# Patient Record
Sex: MALE | Race: WHITE | NOT HISPANIC OR LATINO | URBAN - METROPOLITAN AREA
[De-identification: names, ages, dates, MRNs, and addresses within clinical notes are randomized per-mention and may not be internally consistent; named-entity substitution may affect disease eponyms.]

---

## 2018-05-07 ENCOUNTER — OFFICE VISIT (OUTPATIENT)
Dept: FAMILY MEDICINE | Facility: CLINIC | Age: 58
End: 2018-05-07
Payer: COMMERCIAL

## 2018-05-07 VITALS
WEIGHT: 165.81 LBS | OXYGEN SATURATION: 98 % | HEART RATE: 80 BPM | HEIGHT: 69 IN | TEMPERATURE: 99 F | SYSTOLIC BLOOD PRESSURE: 150 MMHG | BODY MASS INDEX: 24.56 KG/M2 | DIASTOLIC BLOOD PRESSURE: 90 MMHG

## 2018-05-07 DIAGNOSIS — Z23 NEED FOR TDAP VACCINATION: ICD-10-CM

## 2018-05-07 DIAGNOSIS — Z12.11 ENCOUNTER FOR SCREENING FOR MALIGNANT NEOPLASM OF COLON: ICD-10-CM

## 2018-05-07 DIAGNOSIS — M62.830 BACK SPASM: Primary | ICD-10-CM

## 2018-05-07 PROCEDURE — 3008F BODY MASS INDEX DOCD: CPT | Mod: CPTII,S$GLB,, | Performed by: FAMILY MEDICINE

## 2018-05-07 PROCEDURE — 99213 OFFICE O/P EST LOW 20 MIN: CPT | Mod: 25,S$GLB,, | Performed by: FAMILY MEDICINE

## 2018-05-07 PROCEDURE — 90715 TDAP VACCINE 7 YRS/> IM: CPT | Mod: S$GLB,,, | Performed by: FAMILY MEDICINE

## 2018-05-07 PROCEDURE — 90471 IMMUNIZATION ADMIN: CPT | Mod: S$GLB,,, | Performed by: FAMILY MEDICINE

## 2018-05-07 RX ORDER — METHOCARBAMOL 500 MG/1
500 TABLET, FILM COATED ORAL 3 TIMES DAILY
Qty: 30 TABLET | Refills: 0 | Status: SHIPPED | OUTPATIENT
Start: 2018-05-07 | End: 2018-05-22 | Stop reason: SDUPTHER

## 2018-05-07 RX ORDER — NAPROXEN SODIUM 220 MG
220 TABLET ORAL
COMMUNITY
End: 2018-05-21 | Stop reason: SDUPTHER

## 2018-05-09 ENCOUNTER — TELEPHONE (OUTPATIENT)
Dept: FAMILY MEDICINE | Facility: CLINIC | Age: 58
End: 2018-05-09

## 2018-05-09 RX ORDER — VARENICLINE TARTRATE 0.5 (11)-1
KIT ORAL
Qty: 1 PACKAGE | Refills: 0 | Status: SHIPPED | OUTPATIENT
Start: 2018-05-09 | End: 2019-06-10

## 2018-05-09 RX ORDER — VARENICLINE TARTRATE 1 MG/1
1 TABLET, FILM COATED ORAL 2 TIMES DAILY
Qty: 60 TABLET | Refills: 2 | Status: SHIPPED | OUTPATIENT
Start: 2018-06-08 | End: 2019-06-10

## 2018-05-09 RX ORDER — PREDNISONE 10 MG/1
TABLET ORAL
Qty: 18 TABLET | Refills: 0 | Status: SHIPPED | OUTPATIENT
Start: 2018-05-09 | End: 2019-06-10

## 2018-05-09 NOTE — TELEPHONE ENCOUNTER
----- Message from Kym Lopez sent at 5/9/2018  9:42 AM CDT -----  Contact: Wife / [phone]  The steroids were not sent to the pharmacy.  [facility] to [facility].  Also, he has dedided that he is willing to try the Chantix,  Please send that one to [facility] also.  Was here on Monday for doctor visit

## 2018-05-13 NOTE — PROGRESS NOTES
Patient ID: Jayden Panchal is a 57 y.o. male.    Chief Complaint: Back Pain (due for Tetanus vaccine C scope ) and Leg Pain (recheck B/P)    HPI       Jayden Panchal is a 57 y.o. male here because of acute onset of low back pain.  Patient with history of back pain with exacerbation once or twice a year which lasts just a few days and treated with over-the-counter medicines.  This episode which provoked by increased use has lasted for about 2 weeks without reduction of symptoms with over-the-counter medicines.  Use of NSAIDs heat and ice have not provided relief.  He does have pain of burning-type nature radiating toward right leg but more in the gluteal area.  No loss of strength.  No red flags.  Complains that standing straight and laying down or okay.  Most pain is actually sits for a while and attempts to get up        Review of Symptoms    Constitutional  Neg activity change, No chills/ fever   Resp  Neg hemoptysis, stridor, choking  CVS  Neg chest pain, palpitations    Physical Exam    Constitutional:   Oriented to person, place, and time.appears well-developed and well-nourished.   No distress.     HENT:   Head: Normocephalic and atraumatic.     Right Ear: Tympanic membrane, external ear and ear canal normal     Left Ear: Tympanic membrane, external ear and ear canal normal    Nose: External Normal. Normal turbinates, No rhinorrhea     Mouth/Throat: Uvula is midline, oropharynx is clear and moist and mucous membranes are normal.     Neck: supple no anterior cervical adenopathy    Carotid artery:  Bruit    NEG []   POS[]    Eyes:   Conjunctivae are normal. Right eye exhibits no discharge. Left eye exhibits no discharge. No scleral icterus. No periorbital edema       Cardiovascular:  Regular rate, Regular rhythm     No extrasystole  Normal S1-S2    Pulmonary/Chest:   Normal effort and breath sounds.  No wheezing, rhonchi or rales.      Musculoskeletal:  No edema. No obvious deformity No wasting    appears  uncomfortable and paces occasionally because of pain  Gait is normal.  Strength of upper and lower leg muscle groups normal   sensation is normal.  Able to flex forward about hip over 90 degrees and able to extend spine backwards with no pain.  Experiences pain with elevation from a seated position.      Neurological:   Alert and oriented to person, place, and time. Coordination normal.     Skin:   Skin is warm and dry.   No diaphoresis.   No rash noted.    Psychiatric: Normal mood and affect. Behavior is normal. Judgment and thought content normal.       Assessment / Plan:      ICD-10-CM ICD-9-CM   1. Back spasm M62.830 724.8   2. Encounter for screening for malignant neoplasm of colon Z12.11 V76.51   3. Need for Tdap vaccination Z23 V06.1     Back spasm  -     Ambulatory Referral to Physical/Occupational Therapy    Encounter for screening for malignant neoplasm of colon  -     Case request GI: COLONOSCOPY    Need for Tdap vaccination  -     Tdap Vaccine    Other orders  -     methocarbamol (ROBAXIN) 500 MG Tab; Take 1 tablet (500 mg total) by mouth 3 (three) times daily. As needed for muscle spasm  Dispense: 30 tablet; Refill: 0

## 2018-05-21 ENCOUNTER — TELEPHONE (OUTPATIENT)
Dept: FAMILY MEDICINE | Facility: CLINIC | Age: 58
End: 2018-05-21

## 2018-05-21 RX ORDER — NAPROXEN SODIUM 220 MG
220 TABLET ORAL EVERY 12 HOURS
Qty: 60 TABLET | Refills: 0 | COMMUNITY
Start: 2018-05-21 | End: 2019-06-10

## 2018-05-21 NOTE — TELEPHONE ENCOUNTER
----- Message from Kym Lopez sent at 5/21/2018  1:35 PM CDT -----  Contact: Wife called / [phone]  Patient is requesting a medication refill.     RX name: naproxen sodium   Strength: 220 mg  Quantity: Not sure  Directions: Take 220 mg by mouth every 12 (twelve) hours    [facility]      Phone number where patient can be reached: [phone]    ALSO, the pt is also requesting pain medication to.  Please send both meds to [facility].  Pt states you discussed this a couple of weeks ago.

## 2018-05-22 ENCOUNTER — TELEPHONE (OUTPATIENT)
Dept: FAMILY MEDICINE | Facility: CLINIC | Age: 58
End: 2018-05-22

## 2018-05-22 RX ORDER — METHOCARBAMOL 500 MG/1
500 TABLET, FILM COATED ORAL 3 TIMES DAILY
Qty: 30 TABLET | Refills: 1 | Status: SHIPPED | OUTPATIENT
Start: 2018-05-22 | End: 2018-06-01

## 2018-05-22 NOTE — TELEPHONE ENCOUNTER
----- Message from Kym Lopez sent at 5/22/2018  8:32 AM CDT -----  Contact: Wife / [phone]  Please call Mrs Panchal back regarding the pt's medication.  Received a call stating his meds were called in but the pharmacy does not have anything for him.... Give her a call at [phone]

## 2018-05-22 NOTE — TELEPHONE ENCOUNTER
Called pt back and explained that it was my mistake Dr Biggs said he sent in the Rx but it was put in as OTC. Ms Kate says that Dr Biggs said that he would put the pt on a true pain medication and a muscle relaxer. She says that he is in a great deal of pain and needs something done ASAP because he is getting mad and angry with her

## 2018-05-23 ENCOUNTER — TELEPHONE (OUTPATIENT)
Dept: GASTROENTEROLOGY | Facility: CLINIC | Age: 58
End: 2018-05-23

## 2018-05-23 NOTE — TELEPHONE ENCOUNTER
Colonoscopy Referral    Referring Physician: Dr. Delaney       Date: 07/11/2018    Reason for Referral: Screening Colon     Family History of: None  Colon polyp:  Relationship/Age of Onset:     Colon cancer:   Relationship/Age of Onset:     Patient with:   Hemoccults Done: No     Iron deficient: No     On Blood Thinner: No     Valvular heart disease/valve replacement: No     Anemia Present: No     On NSAID: No     Lung disease: No     Kidney disease: No     Hx of polyps:     Hx of colon cancer:     Previous colon evalations:  None   When:   Where:   Pertinent symptoms:       Review of patient's allergies indicates:  PCN, Neurontin     Patient was scheduled for colonoscopy on 07/11/2018 with Dr. Mcgarry at [facility]. Golytely instructions were reviewed with patient.

## 2018-07-18 ENCOUNTER — ANESTHESIA (OUTPATIENT)
Dept: ENDOSCOPY | Facility: HOSPITAL | Age: 58
End: 2018-07-18
Payer: COMMERCIAL

## 2018-07-18 ENCOUNTER — HOSPITAL ENCOUNTER (OUTPATIENT)
Facility: HOSPITAL | Age: 58
Discharge: HOME | End: 2018-07-18
Attending: INTERNAL MEDICINE | Admitting: INTERNAL MEDICINE
Payer: COMMERCIAL

## 2018-07-18 ENCOUNTER — ANESTHESIA EVENT (OUTPATIENT)
Dept: ENDOSCOPY | Facility: HOSPITAL | Age: 58
End: 2018-07-18
Payer: COMMERCIAL

## 2018-07-18 ENCOUNTER — SURGERY (OUTPATIENT)
Age: 58
End: 2018-07-18

## 2018-07-18 VITALS
OXYGEN SATURATION: 100 % | RESPIRATION RATE: 22 BRPM | SYSTOLIC BLOOD PRESSURE: 152 MMHG | HEIGHT: 69 IN | WEIGHT: 176 LBS | TEMPERATURE: 97 F | DIASTOLIC BLOOD PRESSURE: 86 MMHG | HEART RATE: 95 BPM | BODY MASS INDEX: 26.07 KG/M2

## 2018-07-18 DIAGNOSIS — Z12.11 SCREEN FOR COLON CANCER: Primary | ICD-10-CM

## 2018-07-18 PROCEDURE — 45380 COLONOSCOPY AND BIOPSY: CPT | Performed by: INTERNAL MEDICINE

## 2018-07-18 PROCEDURE — 88305 TISSUE EXAM BY PATHOLOGIST: CPT | Performed by: PATHOLOGY

## 2018-07-18 PROCEDURE — 45385 COLONOSCOPY W/LESION REMOVAL: CPT | Performed by: INTERNAL MEDICINE

## 2018-07-18 PROCEDURE — 37000009 HC ANESTHESIA EA ADD 15 MINS: Performed by: INTERNAL MEDICINE

## 2018-07-18 PROCEDURE — 88305 TISSUE EXAM BY PATHOLOGIST: CPT | Mod: 26,,, | Performed by: PATHOLOGY

## 2018-07-18 PROCEDURE — 27201012 HC FORCEPS, HOT/COLD, DISP: Performed by: INTERNAL MEDICINE

## 2018-07-18 PROCEDURE — 45385 COLONOSCOPY W/LESION REMOVAL: CPT | Mod: 33,,, | Performed by: INTERNAL MEDICINE

## 2018-07-18 PROCEDURE — 63600175 PHARM REV CODE 636 W HCPCS: Performed by: NURSE ANESTHETIST, CERTIFIED REGISTERED

## 2018-07-18 PROCEDURE — 25000003 PHARM REV CODE 250: Performed by: INTERNAL MEDICINE

## 2018-07-18 PROCEDURE — 37000008 HC ANESTHESIA 1ST 15 MINUTES: Performed by: INTERNAL MEDICINE

## 2018-07-18 PROCEDURE — 45380 COLONOSCOPY AND BIOPSY: CPT | Mod: 59,,, | Performed by: INTERNAL MEDICINE

## 2018-07-18 PROCEDURE — 27201089 HC SNARE, DISP (ANY): Performed by: INTERNAL MEDICINE

## 2018-07-18 RX ORDER — LIDOCAINE HCL/PF 100 MG/5ML
SYRINGE (ML) INTRAVENOUS
Status: DISCONTINUED | OUTPATIENT
Start: 2018-07-18 | End: 2018-07-18

## 2018-07-18 RX ORDER — PROPOFOL 10 MG/ML
VIAL (ML) INTRAVENOUS
Status: DISCONTINUED | OUTPATIENT
Start: 2018-07-18 | End: 2018-07-18

## 2018-07-18 RX ORDER — PROPOFOL 10 MG/ML
VIAL (ML) INTRAVENOUS CONTINUOUS PRN
Status: DISCONTINUED | OUTPATIENT
Start: 2018-07-18 | End: 2018-07-18

## 2018-07-18 RX ORDER — SODIUM CHLORIDE 9 MG/ML
INJECTION, SOLUTION INTRAVENOUS CONTINUOUS
Status: DISCONTINUED | OUTPATIENT
Start: 2018-07-18 | End: 2018-07-18 | Stop reason: HOSPADM

## 2018-07-18 RX ORDER — SODIUM CHLORIDE 0.9 % (FLUSH) 0.9 %
3 SYRINGE (ML) INJECTION
Status: DISCONTINUED | OUTPATIENT
Start: 2018-07-18 | End: 2018-07-18 | Stop reason: HOSPADM

## 2018-07-18 RX ADMIN — PROPOFOL 70 MG: 10 INJECTION, EMULSION INTRAVENOUS at 11:07

## 2018-07-18 RX ADMIN — LIDOCAINE HYDROCHLORIDE 100 MG: 20 INJECTION, SOLUTION INTRAVENOUS at 11:07

## 2018-07-18 RX ADMIN — PROPOFOL 150 MCG/KG/MIN: 10 INJECTION, EMULSION INTRAVENOUS at 11:07

## 2018-07-18 RX ADMIN — SODIUM CHLORIDE: 0.9 INJECTION, SOLUTION INTRAVENOUS at 10:07

## 2018-07-18 NOTE — H&P
Cc:  Screening colonoscopy    56 yo M here for screening colonoscopy.  He denies FH of colon cancer, change in bowel habits, rectal bleeding, or weight loss.    ROS:  All reviewed and negative    Alert and oriented, NAD, well appearing  RRR, no murmurs  CTA bilaterally, no crackles  Soft, NT/ND, normal BS  Normal distal pulses, no edema    Assessment:  Screening colonoscopy    Plan:  Proceed with endoscopy, further recommendations after endoscopy complete

## 2018-07-18 NOTE — PLAN OF CARE
Past Medical History:   Diagnosis Date    Colon polyps 07/18/2018     Dr. Mcgarry visited at bedside, discussed findings and recommendations with patient and spouse; all questions asked and answered. Verbalized understanding of information give. Handout provided at time of discharge.

## 2018-07-18 NOTE — ANESTHESIA PREPROCEDURE EVALUATION
07/18/2018  Jayden Panchal is a 57 y.o., male for colonoscopy under MAC          Anesthesia Evaluation    I have reviewed the Patient Summary Reports.     I have reviewed the Medications.   Prednisone    Review of Systems  Social:  Alcohol Use, Smoker    Cardiovascular:  Cardiovascular Normal Exercise tolerance: good     Hepatic/GI:   Bowel Prep.        Physical Exam  General:  Well nourished       Chest/Lungs:  Chest/Lungs Clear    Heart/Vascular:  Heart Findings: Normal            Anesthesia Plan  Type of Anesthesia, risks & benefits discussed:  Anesthesia Type:  MAC  Patient's Preference:   Intra-op Monitoring Plan:   Intra-op Monitoring Plan Comments:   Post Op Pain Control Plan:   Post Op Pain Control Plan Comments:   Induction:    Beta Blocker:  Patient is not currently on a Beta-Blocker (No further documentation required).       Informed Consent: Patient understands risks and agrees with Anesthesia plan.  Questions answered. Anesthesia consent signed with patient.  ASA Score: 2     Day of Surgery Review of History & Physical:            Ready For Surgery From Anesthesia Perspective.

## 2018-07-18 NOTE — OR NURSING
Family unable to be reached   Patient in waiting room and doesn't own a cell phone   No one answering in waiting room

## 2018-07-18 NOTE — ANESTHESIA POSTPROCEDURE EVALUATION
"Anesthesia Post Evaluation    Patient: Jayden Panchal    Procedure(s) Performed: Procedure(s) (LRB):  COLONOSCOPY (N/A)    Final Anesthesia Type: MAC  Patient location during evaluation: GI PACU  Patient participation: Yes- Able to Participate  Level of consciousness: awake and alert and oriented  Post-procedure vital signs: reviewed and stable  Pain management: adequate  Airway patency: patent  PONV status at discharge: No PONV  Anesthetic complications: no      Cardiovascular status: blood pressure returned to baseline, hemodynamically stable and stable  Respiratory status: unassisted, spontaneous ventilation and room air  Hydration status: euvolemic  Follow-up not needed.        Visit Vitals  BP (!) 157/71   Pulse 76   Temp 36.9 °C (98.4 °F)   Resp 18   Ht 5' 9" (1.753 m)   Wt 79.8 kg (176 lb)   SpO2 100%   BMI 25.99 kg/m²       Pain/Alan Score: Pain Assessment Performed: Yes (7/18/2018  9:55 AM)  Presence of Pain: denies (7/18/2018  9:55 AM)      "

## 2018-07-18 NOTE — [employer] PATIENT INSTRUCTIONS
Discharge Summary/Instructions after an Endoscopic Procedure  Patient Name: Jayden Panchal  Patient MRN: [id number]  Patient YOB: 1960 Wednesday, July 18, 2018  Ni Mcgarry MD  RESTRICTIONS:  During your procedure today, you received medications for sedation.  These   medications may affect your judgment, balance and coordination.  Therefore,   for 24 hours, you have the following restrictions:   - DO NOT drive a car, operate machinery, make legal/financial decisions,   sign important papers or drink alcohol.    ACTIVITY:  Today: no heavy lifting, straining or running due to procedural   sedation/anesthesia.  The following day: return to full activity including work.  DIET:  Eat and drink normally unless instructed otherwise.     TREATMENT FOR COMMON SIDE EFFECTS:  - Mild abdominal pain, nausea, belching, bloating or excessive gas:  rest,   eat lightly and use a heating pad.  - Sore Throat: treat with throat lozenges and/or gargle with warm salt   water.  - Because air was used during the procedure, expelling large amounts of air   from your rectum or belching is normal.  - If a bowel prep was taken, you may not have a bowel movement for 1-3 days.    This is normal.  SYMPTOMS TO WATCH FOR AND REPORT TO YOUR PHYSICIAN:  1. Abdominal pain or bloating, other than gas cramps.  2. Chest pain.  3. Back pain.  4. Signs of infection such as: chills or fever occurring within 24 hours   after the procedure.  5. Rectal bleeding, which would show as bright red, maroon, or black stools.   (A tablespoon of blood from the rectum is not serious, especially if   hemorrhoids are present.)  6. Vomiting.  7. Weakness or dizziness.  GO DIRECTLY TO THE NEAREST EMERGENCY ROOM IF YOU HAVE ANY OF THE FOLLOWING:      Difficulty breathing              Chills and/or fever over 101 F   Persistent vomiting and/or vomiting blood   Severe abdominal pain   Severe chest pain   Black, tarry stools   Bleeding- more than one  tablespoon   Any other symptom or condition that you feel may need urgent attention  Your doctor recommends these additional instructions:  If any biopsies were taken, your doctors clinic will contact you in 1 to 2   weeks with any results.  - Discharge patient to home.   - Patient has a contact number available for emergencies.  The signs and   symptoms of potential delayed complications were discussed with the   patient.  Return to normal activities tomorrow.  Written discharge   instructions were provided to the patient.   - Resume previous diet.   - Continue present medications.   - Await pathology results.   - Repeat colonoscopy for surveillance based on pathology results.   - Return to GI clinic PRN.  For questions, problems or results please call your physician - Ni Mcgarry MD at Work:  [phone].  EMERGENCY PHONE NUMBER: [phone],  LAB RESULTS: [phone]  IF A COMPLICATION OR EMERGENCY SITUATION ARISES AND YOU ARE UNABLE TO REACH   YOUR PHYSICIAN - GO DIRECTLY TO THE EMERGENCY ROOM.  MD Ni Ramos MD  7/18/2018 11:48:51 AM  This report has been verified and signed electronically.  [employer]

## 2018-07-18 NOTE — DISCHARGE INSTRUCTIONS

## 2018-07-18 NOTE — TRANSFER OF CARE
"Anesthesia Transfer of Care Note    Patient: Jayden Panchal    Procedure(s) Performed: Procedure(s) (LRB):  COLONOSCOPY (N/A)    Patient location: GI    Anesthesia Type: MAC    Transport from OR: Transported from OR on room air with adequate spontaneous ventilation    Post pain: adequate analgesia    Post assessment: no apparent anesthetic complications and tolerated procedure well    Post vital signs: stable    Level of consciousness: awake, alert and oriented    Nausea/Vomiting: no nausea/vomiting    Complications: none    Transfer of care protocol was followed      Last vitals:   Visit Vitals  BP (!) 157/71   Pulse 76   Temp 36.9 °C (98.4 °F)   Resp 18   Ht 5' 9" (1.753 m)   Wt 79.8 kg (176 lb)   SpO2 100%   BMI 25.99 kg/m²     "

## 2018-07-25 ENCOUNTER — TELEPHONE (OUTPATIENT)
Dept: GASTROENTEROLOGY | Facility: CLINIC | Age: 58
End: 2018-07-25

## 2018-07-25 NOTE — TELEPHONE ENCOUNTER
A message was left on patient's voicemail to give the office a call back in regards to results from Colonoscopy. Recall made for follow-up in 3 years

## 2018-07-25 NOTE — TELEPHONE ENCOUNTER
----- Message from Ni Mcgarry MD sent at 7/25/2018 12:16 PM CDT -----  Three TA, one hyperplastic polyp.  Repeat colonoscopy 3 years with 2 day prep.

## 2018-07-25 NOTE — TELEPHONE ENCOUNTER
----- Message from Didi Degroot sent at 7/25/2018  2:21 PM CDT -----  Contact: [phone]/self  Patient is returning your call. Please advise.

## 2019-06-10 ENCOUNTER — OFFICE VISIT (OUTPATIENT)
Dept: FAMILY MEDICINE | Facility: CLINIC | Age: 59
End: 2019-06-10
Payer: COMMERCIAL

## 2019-06-10 VITALS
BODY MASS INDEX: 24.54 KG/M2 | DIASTOLIC BLOOD PRESSURE: 82 MMHG | TEMPERATURE: 99 F | SYSTOLIC BLOOD PRESSURE: 138 MMHG | OXYGEN SATURATION: 98 % | HEART RATE: 84 BPM | HEIGHT: 69 IN | WEIGHT: 165.69 LBS

## 2019-06-10 DIAGNOSIS — N30.01 ACUTE CYSTITIS WITH HEMATURIA: ICD-10-CM

## 2019-06-10 DIAGNOSIS — R35.0 URINARY FREQUENCY: Primary | ICD-10-CM

## 2019-06-10 LAB
BILIRUB SERPL-MCNC: ABNORMAL MG/DL
BLOOD URINE, POC: ABNORMAL
COLOR, POC UA: YELLOW
GLUCOSE UR QL STRIP: ABNORMAL
KETONES UR QL STRIP: ABNORMAL
LEUKOCYTE ESTERASE URINE, POC: ABNORMAL
NITRITE, POC UA: ABNORMAL
PH, POC UA: 5
PROTEIN, POC: ABNORMAL
SPECIFIC GRAVITY, POC UA: 1.02
UROBILINOGEN, POC UA: ABNORMAL

## 2019-06-10 PROCEDURE — 99213 PR OFFICE/OUTPT VISIT, EST, LEVL III, 20-29 MIN: ICD-10-PCS | Mod: 25,S$GLB,, | Performed by: FAMILY MEDICINE

## 2019-06-10 PROCEDURE — 81002 URINALYSIS NONAUTO W/O SCOPE: CPT | Mod: S$GLB,,, | Performed by: FAMILY MEDICINE

## 2019-06-10 PROCEDURE — 99213 OFFICE O/P EST LOW 20 MIN: CPT | Mod: 25,S$GLB,, | Performed by: FAMILY MEDICINE

## 2019-06-10 PROCEDURE — 3008F BODY MASS INDEX DOCD: CPT | Mod: CPTII,S$GLB,, | Performed by: FAMILY MEDICINE

## 2019-06-10 PROCEDURE — 81002 POCT URINE DIPSTICK WITHOUT MICROSCOPE: ICD-10-PCS | Mod: S$GLB,,, | Performed by: FAMILY MEDICINE

## 2019-06-10 PROCEDURE — 3008F PR BODY MASS INDEX (BMI) DOCUMENTED: ICD-10-PCS | Mod: CPTII,S$GLB,, | Performed by: FAMILY MEDICINE

## 2019-06-10 RX ORDER — ESCITALOPRAM OXALATE 10 MG/1
10 TABLET ORAL DAILY
Qty: 30 TABLET | Refills: 11 | Status: SHIPPED | OUTPATIENT
Start: 2019-06-10 | End: 2022-04-05

## 2019-06-10 RX ORDER — DOXYCYCLINE HYCLATE 100 MG
100 TABLET ORAL 2 TIMES DAILY
Qty: 30 TABLET | Refills: 0 | Status: SHIPPED | OUTPATIENT
Start: 2019-06-10 | End: 2022-04-05

## 2019-06-10 RX ORDER — SILDENAFIL CITRATE 20 MG/1
TABLET ORAL
Qty: 30 TABLET | Refills: 2 | Status: SHIPPED | OUTPATIENT
Start: 2019-06-10 | End: 2019-06-14 | Stop reason: SDUPTHER

## 2019-06-14 ENCOUNTER — TELEPHONE (OUTPATIENT)
Dept: FAMILY MEDICINE | Facility: CLINIC | Age: 59
End: 2019-06-14

## 2019-06-14 RX ORDER — SILDENAFIL CITRATE 20 MG/1
TABLET ORAL
Qty: 30 TABLET | Refills: 2 | Status: SHIPPED | OUTPATIENT
Start: 2019-06-14 | End: 2020-05-11 | Stop reason: SDUPTHER

## 2019-06-14 NOTE — TELEPHONE ENCOUNTER
Needs PA for  Sildenafil 20 mg -   Did you recommend he get it some where else?  If so he needs a written rx or do I need to attempt the PA?

## 2019-06-14 NOTE — TELEPHONE ENCOUNTER
That is why gave them a printed copy of the medication-usually cheaper when you go to [facility].  [facility] has 30 tablets for 10 dollars

## 2019-06-17 NOTE — PROGRESS NOTES
" Patient ID: Jayden Panchal is a 58 y.o. male.    Chief Complaint: Discuss bladder issues    HPI      Jayden Panchal is a 58 y.o. male co of frequent urination at night. No dysuria, hematuria, change in color or odor of urine.    Co of ed    LOTS of stress at work due to demanding boss/      Vitals:    06/10/19 1033   BP: 138/82   Pulse: 84   Temp: 98.8 °F (37.1 °C)   SpO2: 98%   Weight: 75.1 kg (165 lb 10.8 oz)   Height: 5' 9" (1.753 m)            Review of Symptoms    Constitutional  Neg activity change, No chills /fever   Resp  Neg hemoptysis, stridor, choking  CVS  Neg chest pain, palpitations    Physical Exam    Constitutional:  Oriented to person, place, and time.appears well-developed and well-nourished.  No distress.      HENT  Head: Normocephalic and atraumatic  Right Ear: External ear normal.   Left Ear: External ear normal.   Nose: External nose normal.   Mouth:  Moist mucus membranes.    Eyes:  Conjunctivae are normal. Right eye exhibits no discharge.  Left eye exhibits no discharge. No scleral icterus.  No periorbital edema    Cardiovascular:  Regular rate and rhythm with normal S1 and S2     Pulmonary/Chest:   Clear to auscultation bilaterally without wheezes, rhonchi or rales      Musculoskeletal:  No edema. No obvious deformity No wasting       Neurological:  Alert and oriented to person, place, and time.   Coordination normal.     Skin:   Skin is warm and dry.  No diaphoresis.   No rash noted.     Psychiatric: Normal mood and affect. Behavior is normal.  Judgment and thought content normal.     Complete Blood Count  Lab Results   Component Value Date    RBC 4.89 08/10/2016    HGB 15.4 08/10/2016    HCT 47.1 08/10/2016    MCV 96 08/10/2016    MCH 31.5 (H) 08/10/2016    MCHC 32.7 08/10/2016    RDW 12.5 08/10/2016     08/10/2016    MPV 10.4 08/10/2016    GRAN 3.3 08/10/2016    GRAN 53.5 08/10/2016    LYMPH 2.1 08/10/2016    LYMPH 33.1 08/10/2016    MONO 0.7 08/10/2016    MONO 10.8 08/10/2016    " EOS 0.1 08/10/2016    BASO 0.04 08/10/2016    EOSINOPHIL 1.8 08/10/2016    BASOPHIL 0.6 08/10/2016    DIFFMETHOD Automated 08/10/2016       Comprehensive Metabolic Panel  No results found for: GLU, BUN, CREATININE, NA, K, CL, PROT, ALBUMIN, BILITOT, AST, ALKPHOS, CO2, ALT, ANIONGAP, EGFRNONAA, ESTGFRAFRICA    TSH  No results found for: TSH    Assessment / Plan:      ICD-10-CM ICD-9-CM   1. Urinary frequency R35.0 788.41   2. Acute cystitis with hematuria N30.01 595.0     Urinary frequency  -     Urinalysis; Future; Expected date: 06/10/2019  -     POCT URINE DIPSTICK WITHOUT MICROSCOPE    Acute cystitis with hematuria  -     Urinalysis; Future; Expected date: 06/10/2019  -     POCT URINE DIPSTICK WITHOUT MICROSCOPE    Other orders  -     doxycycline (VIBRA-TABS) 100 MG tablet; Take 1 tablet (100 mg total) by mouth 2 (two) times daily.  Dispense: 30 tablet; Refill: 0  -     escitalopram oxalate (LEXAPRO) 10 MG tablet; Take 1 tablet (10 mg total) by mouth once daily.  Dispense: 30 tablet; Refill: 11  -     Discontinue: sildenafil (REVATIO) 20 mg Tab; Take 3 to 5 pills each pm as needed for ed  Dispense: 30 tablet; Refill: 2

## 2020-05-11 RX ORDER — SILDENAFIL CITRATE 20 MG/1
TABLET ORAL
Qty: 30 TABLET | Refills: 2 | Status: SHIPPED | OUTPATIENT
Start: 2020-05-11 | End: 2020-05-29 | Stop reason: SDUPTHER

## 2020-05-11 RX ORDER — SILDENAFIL CITRATE 20 MG/1
TABLET ORAL
Qty: 30 TABLET | Refills: 2 | Status: SHIPPED | OUTPATIENT
Start: 2020-05-11 | End: 2020-05-11 | Stop reason: SDUPTHER

## 2020-05-29 ENCOUNTER — TELEPHONE (OUTPATIENT)
Dept: ADMINISTRATIVE | Facility: HOSPITAL | Age: 60
End: 2020-05-29

## 2020-05-29 RX ORDER — SILDENAFIL CITRATE 20 MG/1
TABLET ORAL
Qty: 30 TABLET | Refills: 2 | Status: SHIPPED | OUTPATIENT
Start: 2020-05-29 | End: 2021-03-18

## 2020-05-29 RX ORDER — VARENICLINE TARTRATE 1 MG/1
1 TABLET, FILM COATED ORAL 2 TIMES DAILY
Qty: 60 TABLET | Refills: 2 | Status: SHIPPED | OUTPATIENT
Start: 2020-05-29 | End: 2022-04-05

## 2020-05-29 RX ORDER — VARENICLINE TARTRATE 0.5 (11)-1
KIT ORAL
Qty: 1 PACKAGE | Refills: 0 | Status: SHIPPED | OUTPATIENT
Start: 2020-05-29 | End: 2022-04-05

## 2020-05-29 NOTE — TELEPHONE ENCOUNTER
Sent in medication do not saying he does not to come in ever five years.  No need to repeat cholesterol five years

## 2020-05-29 NOTE — ADDENDUM NOTE
Addended by: MACHO CANADA on: 5/29/2020 01:31 PM     Modules accepted: Orders     Bexarotene Pregnancy And Lactation Text: This medication is Pregnancy Category X and should not be given to women who are pregnant or may become pregnant. This medication should not be used if you are breast feeding.

## 2020-05-29 NOTE — TELEPHONE ENCOUNTER
Pt states he is requesting a refill for sildenafil 20mg sent to [facility] and a prescription of Chantix, explained to wife that he would need an appointment but she said that Dr. Biggs told him he did not have to make an appointment until 5 years.  Last appointment 06/19/2019.

## 2021-03-18 RX ORDER — SILDENAFIL CITRATE 20 MG/1
TABLET ORAL
Qty: 30 TABLET | Refills: 2 | Status: SHIPPED | OUTPATIENT
Start: 2021-03-18 | End: 2022-04-05 | Stop reason: SDUPTHER

## 2022-01-04 ENCOUNTER — LAB VISIT (OUTPATIENT)
Dept: PRIMARY CARE CLINIC | Facility: OTHER | Age: 62
End: 2022-01-04
Attending: INTERNAL MEDICINE

## 2022-01-04 DIAGNOSIS — U07.1 COVID-19: Primary | ICD-10-CM

## 2022-01-04 LAB
CTP QC/QA: YES
SARS-COV-2 AG RESP QL IA.RAPID: POSITIVE

## 2022-01-04 PROCEDURE — 87811 SARS CORONAVIRUS 2 ANTIGEN POCT, MANUAL READ: ICD-10-PCS | Mod: ,,, | Performed by: INTERNAL MEDICINE

## 2022-01-04 PROCEDURE — 87811 SARS-COV-2 COVID19 W/OPTIC: CPT | Mod: ,,, | Performed by: INTERNAL MEDICINE

## 2022-01-04 NOTE — PROGRESS NOTES
Instructions for Patients with Confirmed or Suspected COVID-19    If you are awaiting your test result, you will either be called or it will be released to the patient portal.  If you have any questions about your test, please visit [email or web address] or call our COVID-19 information line at [phone].      Please isolate yourself at home.  You may leave home and/or return to work once the following conditions are met:    If you have symptoms and tested positive:   More than 5 days since symptoms first appeared AND   More than 24 hours fever free without medications AND       symptoms have improved   · For five days after ending isolation, masks are required.    If you had no symptoms but tested positive:   More than 5 days since the date of the first positive test. If you develop symptoms, then use the guidelines above  · For five days after ending isolation, masks are required.      Testing is not recommended if you are symptom free after completing isolation.

## 2022-01-10 ENCOUNTER — PATIENT MESSAGE (OUTPATIENT)
Dept: ADMINISTRATIVE | Facility: HOSPITAL | Age: 62
End: 2022-01-10
Payer: COMMERCIAL

## 2022-02-23 NOTE — TELEPHONE ENCOUNTER
No new care gaps identified.  Powered by [employer] by [employer]. Reference number: [id number].   2/23/2022 1:56:52 PM CST

## 2022-02-23 NOTE — TELEPHONE ENCOUNTER
Encounter details require adjustment(s)/ updating by OR Staff  As of this time Protocols and CDM: did not populate or display   Adjustment(s) made: Department  CDM should display. Medication(s) delegated by the OR.  Will resend refill request encounter to [facility] Centralized Refill Staff Pool.   [facility]   Note composed:1:56 PM 02/23/2022

## 2022-02-24 RX ORDER — SILDENAFIL CITRATE 20 MG/1
TABLET ORAL
Qty: 30 TABLET | Refills: 2 | OUTPATIENT
Start: 2022-02-24

## 2022-04-05 ENCOUNTER — OFFICE VISIT (OUTPATIENT)
Dept: FAMILY MEDICINE | Facility: CLINIC | Age: 62
End: 2022-04-05
Payer: COMMERCIAL

## 2022-04-05 VITALS — BODY MASS INDEX: 21.98 KG/M2 | WEIGHT: 148.81 LBS

## 2022-04-05 DIAGNOSIS — Z00.00 ROUTINE HEALTH MAINTENANCE: Primary | ICD-10-CM

## 2022-04-05 DIAGNOSIS — Z12.11 ENCOUNTER FOR SCREENING FOR MALIGNANT NEOPLASM OF COLON: ICD-10-CM

## 2022-04-05 PROCEDURE — 99396 PREV VISIT EST AGE 40-64: CPT | Mod: S$GLB,,, | Performed by: FAMILY MEDICINE

## 2022-04-05 PROCEDURE — 3008F BODY MASS INDEX DOCD: CPT | Mod: CPTII,S$GLB,, | Performed by: FAMILY MEDICINE

## 2022-04-05 PROCEDURE — 3008F PR BODY MASS INDEX (BMI) DOCUMENTED: ICD-10-PCS | Mod: CPTII,S$GLB,, | Performed by: FAMILY MEDICINE

## 2022-04-05 PROCEDURE — 1159F PR MEDICATION LIST DOCUMENTED IN MEDICAL RECORD: ICD-10-PCS | Mod: CPTII,S$GLB,, | Performed by: FAMILY MEDICINE

## 2022-04-05 PROCEDURE — 99396 PR PREVENTIVE VISIT,EST,40-64: ICD-10-PCS | Mod: S$GLB,,, | Performed by: FAMILY MEDICINE

## 2022-04-05 PROCEDURE — 1159F MED LIST DOCD IN RCRD: CPT | Mod: CPTII,S$GLB,, | Performed by: FAMILY MEDICINE

## 2022-04-05 RX ORDER — SILDENAFIL CITRATE 20 MG/1
TABLET ORAL
Qty: 90 TABLET | Refills: 3 | Status: SHIPPED | OUTPATIENT
Start: 2022-04-05 | End: 2023-05-07

## 2022-04-05 NOTE — PROGRESS NOTES
Patient ID: Jayden Panchal is a 61 y.o. male.    Chief Complaint: Follow-up and Medication Refill    HPI     Jayden Panchal is a 61 y.o. male. here for annual exam.  Doing well overall with no new problems.  Tremendous less stress with new job at [employer]    Review of Symptoms    Constitutional: Negative.    HENT: Negative.    Eyes: Negative.    Respiratory: Negative.    Cardiovascular: Negative.    Gastrointestinal: Negative.    Endocrine: Negative.    Genitourinary: Negative.    Musculoskeletal: Negative.    Skin: Negative.    Allergic/Immunologic: Negative.    Neurological: Negative.    Hematological: Negative.    Psychiatric/Behavioral: Negative.      Except as above in HPI    No current outpatient medications on file prior to visit.     No current facility-administered medications on file prior to visit.         Physical  Exam    Vitals:    04/05/22 1616   Weight: 67.5 kg (148 lb 13 oz)          Constitutional:  Oriented to person, place, and time. Appears well-developed and well-nourished.     HENT:   Head: Normocephalic and atraumatic.     Right Ear: External ear normal     Left Ear: External ear normal      Nose: Nose normal. No rhinorrhea or nasal deformity.     Mouth/Throat: Moist mucus membranes      Eyes: Conjunctivae are normal. Right eye exhibits no discharge. Left eye exhibits no  discharge. No scleral icterus.     Neck:  No JVD present. No tracheal deviation  []  Neck supple.   Carotid Arteries  []  No Bruit    Cardiovascular:  Regular rate and rhythm with normal S1 and S2     Pulmonary/Chest:   Clear to auscultation bilaterally without wheezes, rhonchi or rales    Abdominal: Soft. No distension and no mass.  No tenderness. No rebound and No guarding.     Musculoskeletal: Normal range of motion. No edema or tenderness.   No deformity     Lymphadenopathy:   []  No cervical adenopathy.  []  No inguinal adenopathy    Neurological:  Alert and oriented to person, place, and time. Coordination normal.      Skin: Skin is warm and dry. No rash noted. No bruising     Psychiatric: Normal mood and affect. Speech is normal and behavior is normal. Judgment and thought content normal.       Assessment / Plan:      ICD-10-CM ICD-9-CM   1. Routine health maintenance  Z00.00 V70.0   2. Encounter for screening for malignant neoplasm of colon  Z12.11 V76.51     Routine health maintenance  -     Comprehensive Metabolic Panel; Future  -     CBC Auto Differential; Future  -     Lipid Panel; Future  -     TSH; Future  -     Urinalysis; Future; Expected date: 04/05/2022    Encounter for screening for malignant neoplasm of colon  -     Case Request Endoscopy: COLONOSCOPY    Other orders  -     sildenafil (REVATIO) 20 mg Tab; TAKE 3 TO 5 TABLETS BY MOUTH EVERY EVENING AS NEEDED FOR ED  Dispense: 90 tablet; Refill: 3          Discussed how to stay healthy including: diet, exercise, refraining from smoking and discussed screening exams / tests needed for age, sex and family Hx.              Azeem Rizvi M.D.

## 2022-05-25 ENCOUNTER — PATIENT MESSAGE (OUTPATIENT)
Dept: SMOKING CESSATION | Facility: CLINIC | Age: 62
End: 2022-05-25
Payer: COMMERCIAL

## 2022-10-03 ENCOUNTER — TELEPHONE (OUTPATIENT)
Dept: GASTROENTEROLOGY | Facility: CLINIC | Age: 62
End: 2022-10-03
Payer: COMMERCIAL

## 2022-10-03 NOTE — LETTER
October 3, 2022    Jayden Panchal  [address] [address]             [facility]  [address] [place]   [facility] [address]  Phone: [phone]  Fax: [phone] Dear Mr. Panchal:    We have attempted to contact you to schedule a screening colonoscopy that was ordered by your doctor. Please contact the office to schedule at [phone].       If you have any questions or concerns, please don't hesitate to call.    Sincerely,        Ni Mcgarry MD

## 2022-12-14 ENCOUNTER — HOSPITAL ENCOUNTER (OUTPATIENT)
Dept: RADIOLOGY | Facility: HOSPITAL | Age: 62
Discharge: HOME | End: 2022-12-14
Attending: FAMILY MEDICINE
Payer: COMMERCIAL

## 2022-12-14 DIAGNOSIS — M25.569 PAIN IN JOINT, LOWER LEG: ICD-10-CM

## 2022-12-14 PROCEDURE — 73562 X-RAY EXAM OF KNEE 3: CPT | Mod: 26,LT,, | Performed by: RADIOLOGY

## 2022-12-14 PROCEDURE — 73562 X-RAY EXAM OF KNEE 3: CPT | Mod: TC,FY,PO,LT

## 2022-12-14 PROCEDURE — 73562 XR KNEE 3 VIEW LEFT: ICD-10-PCS | Mod: 26,LT,, | Performed by: RADIOLOGY

## 2022-12-30 ENCOUNTER — TELEPHONE (OUTPATIENT)
Dept: GASTROENTEROLOGY | Facility: CLINIC | Age: 62
End: 2022-12-30
Payer: COMMERCIAL

## 2022-12-30 NOTE — LETTER
December 30, 2022    Jayden Panchal  [address] [facility] [address]             [facility] - Gastroenterology  [address], [place]   [facility] [address]  Phone: [phone]  Fax: [phone] Dear Mr. Panchal:    We have attempted to contact you to schedule a colonoscopy that was ordered by your doctor. Please contact the office to schedule at [phone].       If you have any questions or concerns, please don't hesitate to call.    Sincerely,        Ni Mcgarry MD

## 2023-03-28 ENCOUNTER — TELEPHONE (OUTPATIENT)
Dept: GASTROENTEROLOGY | Facility: CLINIC | Age: 63
End: 2023-03-28
Payer: COMMERCIAL

## 2023-03-28 NOTE — LETTER
March 28, 2023    Jayden Panchal  [address] [facility] [address]             [facility] - Gastroenterology  [address], [place]   [facility] [address]  Phone: [phone]  Fax: [phone] Dear Mr. Panchal:    We have attempted to contact you to schedule a screening colonoscopy that was ordered by your doctor. Please contact the office to schedule at [phone].       If you have any questions or concerns, please don't hesitate to call.    Sincerely,        Ni Mcgarry MD

## 2023-05-06 NOTE — TELEPHONE ENCOUNTER
No care due was identified.  [facility] Embedded Care Due Messages. Reference number: [id number].   5/06/2023 2:01:02 PM CDT

## 2023-05-07 RX ORDER — SILDENAFIL CITRATE 20 MG/1
TABLET ORAL
Qty: 90 TABLET | Refills: 0 | Status: SHIPPED | OUTPATIENT
Start: 2023-05-07 | End: 2023-10-19

## 2023-05-08 NOTE — TELEPHONE ENCOUNTER
Refill Routing Note   Medication(s) are not appropriate for processing by [facility] for the following reason(s):      Required vitals outdated    [facility] action(s):  Defer None identified          Appointments  past 12m or future 3m with PCP    Date Provider   Last Visit   4/5/2022 Azeem Biggs MD   Next Visit   Visit date not found Azeem Biggs MD   ED visits in past 90 days: 0        Note composed:9:15 PM 05/07/2023

## 2023-10-19 RX ORDER — SILDENAFIL CITRATE 20 MG/1
TABLET ORAL
Qty: 90 TABLET | Refills: 0 | Status: SHIPPED | OUTPATIENT
Start: 2023-10-19

## 2023-10-19 NOTE — TELEPHONE ENCOUNTER
No care due was identified.  [facility] Embedded Care Due Messages. Reference number: [id number].   10/19/2023 7:42:25 AM CDT

## 2023-11-08 ENCOUNTER — TELEPHONE (OUTPATIENT)
Dept: FAMILY MEDICINE | Facility: CLINIC | Age: 63
End: 2023-11-08
Payer: COMMERCIAL

## 2023-11-08 NOTE — TELEPHONE ENCOUNTER
----- Message from Hero Crenshaw sent at 11/8/2023 11:41 AM CST -----  Contact: Pt  .Type:  Same Day Appointment Request    Caller is requesting a same day appointment.  Caller declined first available appointment listed below.    Name of Caller:pt. Wife Humble  When is the first available appointment?  Symptoms: pt. Hurt his back can barely stand up  Best Call Back Number:[phone]  Additional Information:

## 2023-11-09 ENCOUNTER — OFFICE VISIT (OUTPATIENT)
Dept: FAMILY MEDICINE | Facility: CLINIC | Age: 63
End: 2023-11-09
Payer: COMMERCIAL

## 2023-11-09 VITALS
BODY MASS INDEX: 22.17 KG/M2 | HEART RATE: 65 BPM | OXYGEN SATURATION: 99 % | SYSTOLIC BLOOD PRESSURE: 136 MMHG | HEIGHT: 69 IN | TEMPERATURE: 98 F | DIASTOLIC BLOOD PRESSURE: 84 MMHG | WEIGHT: 149.69 LBS

## 2023-11-09 DIAGNOSIS — Z00.00 ROUTINE HEALTH MAINTENANCE: ICD-10-CM

## 2023-11-09 DIAGNOSIS — M54.50 ACUTE LOW BACK PAIN WITHOUT SCIATICA, UNSPECIFIED BACK PAIN LATERALITY: ICD-10-CM

## 2023-11-09 DIAGNOSIS — G89.29 CHRONIC MIDLINE LOW BACK PAIN WITHOUT SCIATICA: Primary | ICD-10-CM

## 2023-11-09 DIAGNOSIS — M54.50 CHRONIC MIDLINE LOW BACK PAIN WITHOUT SCIATICA: Primary | ICD-10-CM

## 2023-11-09 DIAGNOSIS — Z23 NEED FOR PNEUMOCOCCAL 20-VALENT CONJUGATE VACCINATION: ICD-10-CM

## 2023-11-09 PROCEDURE — 3008F PR BODY MASS INDEX (BMI) DOCUMENTED: ICD-10-PCS | Mod: CPTII,S$GLB,, | Performed by: FAMILY MEDICINE

## 2023-11-09 PROCEDURE — 99213 OFFICE O/P EST LOW 20 MIN: CPT | Mod: 25,S$GLB,, | Performed by: FAMILY MEDICINE

## 2023-11-09 PROCEDURE — G0009 PNEUMOCOCCAL CONJUGATE VACCINE 20-VALENT: ICD-10-PCS | Mod: S$GLB,,, | Performed by: FAMILY MEDICINE

## 2023-11-09 PROCEDURE — 3079F DIAST BP 80-89 MM HG: CPT | Mod: CPTII,S$GLB,, | Performed by: FAMILY MEDICINE

## 2023-11-09 PROCEDURE — 1159F MED LIST DOCD IN RCRD: CPT | Mod: CPTII,S$GLB,, | Performed by: FAMILY MEDICINE

## 2023-11-09 PROCEDURE — 3075F SYST BP GE 130 - 139MM HG: CPT | Mod: CPTII,S$GLB,, | Performed by: FAMILY MEDICINE

## 2023-11-09 PROCEDURE — G0009 ADMIN PNEUMOCOCCAL VACCINE: HCPCS | Mod: S$GLB,,, | Performed by: FAMILY MEDICINE

## 2023-11-09 PROCEDURE — 3075F PR MOST RECENT SYSTOLIC BLOOD PRESS GE 130-139MM HG: ICD-10-PCS | Mod: CPTII,S$GLB,, | Performed by: FAMILY MEDICINE

## 2023-11-09 PROCEDURE — 3008F BODY MASS INDEX DOCD: CPT | Mod: CPTII,S$GLB,, | Performed by: FAMILY MEDICINE

## 2023-11-09 PROCEDURE — 3079F PR MOST RECENT DIASTOLIC BLOOD PRESSURE 80-89 MM HG: ICD-10-PCS | Mod: CPTII,S$GLB,, | Performed by: FAMILY MEDICINE

## 2023-11-09 PROCEDURE — 90677 PNEUMOCOCCAL CONJUGATE VACCINE 20-VALENT: ICD-10-PCS | Mod: S$GLB,,, | Performed by: FAMILY MEDICINE

## 2023-11-09 PROCEDURE — 1159F PR MEDICATION LIST DOCUMENTED IN MEDICAL RECORD: ICD-10-PCS | Mod: CPTII,S$GLB,, | Performed by: FAMILY MEDICINE

## 2023-11-09 PROCEDURE — 90677 PCV20 VACCINE IM: CPT | Mod: S$GLB,,, | Performed by: FAMILY MEDICINE

## 2023-11-09 PROCEDURE — 99213 PR OFFICE/OUTPT VISIT, EST, LEVL III, 20-29 MIN: ICD-10-PCS | Mod: 25,S$GLB,, | Performed by: FAMILY MEDICINE

## 2023-11-09 NOTE — LETTER
November 9, 2023    Jayden Panchal  [address] [address]         [facility]  [address] [place] [id number]  Phone: [phone]  Fax: [phone] November 9, 2023     Patient: Jayden Panchal   YOB: 1960   Date of Visit: 11/9/2023       To Whom It May Concern:    It is my medical opinion that Jayden Panchal should remain out of work due to a back injury.  I expect him to be out of work for about 7 to 10 more days.  I anticipate him being able to return to work on  11.20 .23.  We will be in contact so if his symptoms improve before then he can be released for work earlier.  .    If you have any questions or concerns, please don't hesitate to call.    Sincerely,        Azeem Biggs MD

## 2023-11-13 NOTE — PROGRESS NOTES
" Patient ID: Jayden Panchal is a 62 y.o. male.    Chief Complaint: Back Pain    HPI       Jayden Panchal is a 62 y.o. male here with complaints of back pain.  Patient was at work and hurt his back.  He has been out for few days-saw urgent care.  Patient is not able to go back to work as scheduled in proximally one day.  Patient has had chronic lower back pain for several years.  List upwards of 4000 pounds over the course of the day.  Has exacerbations of pain periodically.      Review of Symptoms      /84 (BP Location: Left arm, Patient Position: Sitting)   Pulse 65   Temp 98.2 °F (36.8 °C) (Oral)   Ht 5' 9" (1.753 m)   Wt 67.9 kg (149 lb 11.1 oz)   SpO2 99%   BMI 22.11 kg/m²     Physical Exam    Vitals:    11/09/23 0958   BP: 136/84   BP Location: Left arm   Patient Position: Sitting   Pulse: 65   Temp: 98.2 °F (36.8 °C)   TempSrc: Oral   SpO2: 99%   Weight: 67.9 kg (149 lb 11.1 oz)   Height: 5' 9" (1.753 m)       Constitutional:   Oriented to person, place, and time.appears well-developed and well-nourished.   No distress.      HENT  Head: Normocephalic and atraumatic  Right Ear: External ear normal.   Left Ear: External ear normal.   Nose: External nose normal.   Mouth: Moist mucous membranes    Eyes:   Conjunctivae are normal. Right eye exhibits no discharge. Left eye exhibits no discharge. No scleral icterus. No periorbital edema    Musculoskeletal:  No edema. No obvious deformity No wasting moves gingerly because of pain in his back-tender midline lower back.   Gait is normal.  Strength of upper and lower leg muscle groups normal   sensation is normal.  Able to flex forward about hip over 90 degrees and able to extend spine backwards with no pain.    Neurological:  Alert and oriented to person, place, and time. Coordination normal.     Skin:   Skin is warm and dry.  No diaphoresis.   No rash noted.     Psychiatric: Normal mood and affect. Behavior is normal. Judgment and thought content normal. " "    Complete Blood Count  Lab Results   Component Value Date    RBC 4.89 08/10/2016    HGB 15.4 08/10/2016    HCT 47.1 08/10/2016    MCV 96 08/10/2016    MCH 31.5 (H) 08/10/2016    MCHC 32.7 08/10/2016    RDW 12.5 08/10/2016     08/10/2016    MPV 10.4 08/10/2016    GRAN 3.3 08/10/2016    GRAN 53.5 08/10/2016    LYMPH 2.1 08/10/2016    LYMPH 33.1 08/10/2016    MONO 0.7 08/10/2016    MONO 10.8 08/10/2016    EOS 0.1 08/10/2016    BASO 0.04 08/10/2016    EOSINOPHIL 1.8 08/10/2016    BASOPHIL 0.6 08/10/2016    DIFFMETHOD Automated 08/10/2016       Comprehensive Metabolic Panel  No results found for: "GLU", "BUN", "CREATININE", "NA", "K", "CL", "PROT", "ALBUMIN", "BILITOT", "AST", "ALKPHOS", "CO2", "ALT", "ANIONGAP", "EGFRNONAA", "ESTGFRAFRICA"    TSH  No results found for: "TSH"    Assessment / Plan:      ICD-10-CM ICD-9-CM   1. Chronic midline low back pain without sciatica  M54.50 724.2    G89.29 338.29   2. Need for pneumococcal 20-valent conjugate vaccination  Z23 V03.82   3. Routine health maintenance  Z00.00 V70.0   4. Acute low back pain without sciatica, unspecified back pain laterality  M54.50 724.2     Chronic midline low back pain without sciatica    Need for pneumococcal 20-valent conjugate vaccination  -     (In Office Administered) Pneumococcal Conjugate Vaccine (20 Valent) (IM) (Preferred)    Routine health maintenance  -     Comprehensive Metabolic Panel; Future  -     CBC Auto Differential; Future  -     Lipid Panel; Future  -     TSH; Future    Acute low back pain without sciatica, unspecified back pain laterality    Long discussion of chronic back pain that you probably can not do this for several years.  Discussions about care home job changes etcetera.  "

## 2023-11-14 ENCOUNTER — PATIENT MESSAGE (OUTPATIENT)
Dept: FAMILY MEDICINE | Facility: CLINIC | Age: 63
End: 2023-11-14
Payer: COMMERCIAL

## 2024-04-19 ENCOUNTER — LAB VISIT (OUTPATIENT)
Dept: LAB | Facility: HOSPITAL | Age: 64
End: 2024-04-19
Attending: FAMILY MEDICINE
Payer: COMMERCIAL

## 2024-04-19 DIAGNOSIS — Z00.00 ROUTINE HEALTH MAINTENANCE: ICD-10-CM

## 2024-04-19 LAB
ALBUMIN SERPL BCP-MCNC: 4.3 G/DL (ref 3.5–5.2)
ALP SERPL-CCNC: 58 U/L (ref 38–126)
ALT SERPL W/O P-5'-P-CCNC: 21 U/L (ref 10–44)
ANION GAP SERPL CALC-SCNC: 7 MMOL/L (ref 8–16)
AST SERPL-CCNC: 24 U/L (ref 15–46)
BASOPHILS # BLD AUTO: 0.05 K/UL (ref 0–0.2)
BASOPHILS NFR BLD: 1 % (ref 0–1.9)
BILIRUB SERPL-MCNC: 0.7 MG/DL (ref 0.1–1)
CALCIUM SERPL-MCNC: 9.1 MG/DL (ref 8.7–10.5)
CHLORIDE SERPL-SCNC: 105 MMOL/L (ref 95–110)
CHOLEST SERPL-MCNC: 160 MG/DL (ref 120–199)
CHOLEST/HDLC SERPL: 2.7 {RATIO} (ref 2–5)
CO2 SERPL-SCNC: 28 MMOL/L (ref 23–29)
CREAT SERPL-MCNC: 0.8 MG/DL (ref 0.5–1.4)
DIFFERENTIAL METHOD BLD: ABNORMAL
EOSINOPHIL # BLD AUTO: 0.1 K/UL (ref 0–0.5)
EOSINOPHIL NFR BLD: 1.2 % (ref 0–8)
ERYTHROCYTE [DISTWIDTH] IN BLOOD BY AUTOMATED COUNT: 12.1 % (ref 11.5–14.5)
EST. GFR  (NO RACE VARIABLE): >60 ML/MIN/1.73 M^2
GLUCOSE SERPL-MCNC: 86 MG/DL (ref 70–110)
HCT VFR BLD AUTO: 43.8 % (ref 40–54)
HDLC SERPL-MCNC: 60 MG/DL (ref 40–75)
HDLC SERPL: 37.5 % (ref 20–50)
HGB BLD-MCNC: 14.5 G/DL (ref 14–18)
IMM GRANULOCYTES # BLD AUTO: 0.01 K/UL (ref 0–0.04)
IMM GRANULOCYTES NFR BLD AUTO: 0.2 % (ref 0–0.5)
LDLC SERPL CALC-MCNC: 91.8 MG/DL (ref 63–159)
LYMPHOCYTES # BLD AUTO: 1.4 K/UL (ref 1–4.8)
LYMPHOCYTES NFR BLD: 28.5 % (ref 18–48)
MCH RBC QN AUTO: 31.9 PG (ref 27–31)
MCHC RBC AUTO-ENTMCNC: 33.1 G/DL (ref 32–36)
MCV RBC AUTO: 96 FL (ref 82–98)
MONOCYTES # BLD AUTO: 0.6 K/UL (ref 0.3–1)
MONOCYTES NFR BLD: 12.6 % (ref 4–15)
NEUTROPHILS # BLD AUTO: 2.8 K/UL (ref 1.8–7.7)
NEUTROPHILS NFR BLD: 56.5 % (ref 38–73)
NONHDLC SERPL-MCNC: 100 MG/DL
NRBC BLD-RTO: 0 /100 WBC
PLATELET # BLD AUTO: 241 K/UL (ref 150–450)
PMV BLD AUTO: 9.6 FL (ref 9.2–12.9)
POTASSIUM SERPL-SCNC: 5.3 MMOL/L (ref 3.5–5.1)
PROT SERPL-MCNC: 7 G/DL (ref 6–8.4)
RBC # BLD AUTO: 4.55 M/UL (ref 4.6–6.2)
SODIUM SERPL-SCNC: 140 MMOL/L (ref 136–145)
TRIGL SERPL-MCNC: 41 MG/DL (ref 30–150)
TSH SERPL DL<=0.005 MIU/L-ACNC: 0.84 UIU/ML (ref 0.4–4)
UUN UR-MCNC: 17 MG/DL (ref 2–20)
WBC # BLD AUTO: 4.92 K/UL (ref 3.9–12.7)

## 2024-04-19 PROCEDURE — 80053 COMPREHEN METABOLIC PANEL: CPT | Mod: PN | Performed by: FAMILY MEDICINE

## 2024-04-19 PROCEDURE — 36415 COLL VENOUS BLD VENIPUNCTURE: CPT | Mod: PN | Performed by: FAMILY MEDICINE

## 2024-04-19 PROCEDURE — 85025 COMPLETE CBC W/AUTO DIFF WBC: CPT | Mod: PN | Performed by: FAMILY MEDICINE

## 2024-04-19 PROCEDURE — 84443 ASSAY THYROID STIM HORMONE: CPT | Mod: PN | Performed by: FAMILY MEDICINE

## 2024-04-19 PROCEDURE — 80061 LIPID PANEL: CPT | Performed by: FAMILY MEDICINE

## 2024-04-23 ENCOUNTER — OFFICE VISIT (OUTPATIENT)
Dept: FAMILY MEDICINE | Facility: CLINIC | Age: 64
End: 2024-04-23
Payer: COMMERCIAL

## 2024-04-23 ENCOUNTER — TELEPHONE (OUTPATIENT)
Dept: FAMILY MEDICINE | Facility: CLINIC | Age: 64
End: 2024-04-23

## 2024-04-23 VITALS
TEMPERATURE: 98 F | OXYGEN SATURATION: 99 % | BODY MASS INDEX: 22.85 KG/M2 | SYSTOLIC BLOOD PRESSURE: 138 MMHG | HEIGHT: 69 IN | DIASTOLIC BLOOD PRESSURE: 78 MMHG | WEIGHT: 154.31 LBS | HEART RATE: 76 BPM

## 2024-04-23 DIAGNOSIS — Z00.00 ROUTINE HEALTH MAINTENANCE: Primary | ICD-10-CM

## 2024-04-23 DIAGNOSIS — E87.5 HYPERKALEMIA: ICD-10-CM

## 2024-04-23 DIAGNOSIS — Z79.899 LIPID LOWERING THERAPY STARTED: ICD-10-CM

## 2024-04-23 DIAGNOSIS — M54.50 CHRONIC MIDLINE LOW BACK PAIN WITHOUT SCIATICA: ICD-10-CM

## 2024-04-23 DIAGNOSIS — G89.29 CHRONIC MIDLINE LOW BACK PAIN WITHOUT SCIATICA: ICD-10-CM

## 2024-04-23 PROCEDURE — 99396 PREV VISIT EST AGE 40-64: CPT | Mod: S$GLB,,, | Performed by: FAMILY MEDICINE

## 2024-04-23 PROCEDURE — 1159F MED LIST DOCD IN RCRD: CPT | Mod: CPTII,S$GLB,, | Performed by: FAMILY MEDICINE

## 2024-04-23 PROCEDURE — 1160F RVW MEDS BY RX/DR IN RCRD: CPT | Mod: CPTII,S$GLB,, | Performed by: FAMILY MEDICINE

## 2024-04-23 PROCEDURE — 3078F DIAST BP <80 MM HG: CPT | Mod: CPTII,S$GLB,, | Performed by: FAMILY MEDICINE

## 2024-04-23 PROCEDURE — 3008F BODY MASS INDEX DOCD: CPT | Mod: CPTII,S$GLB,, | Performed by: FAMILY MEDICINE

## 2024-04-23 PROCEDURE — 3075F SYST BP GE 130 - 139MM HG: CPT | Mod: CPTII,S$GLB,, | Performed by: FAMILY MEDICINE

## 2024-04-23 RX ORDER — SILDENAFIL CITRATE 20 MG/1
TABLET ORAL
Qty: 90 TABLET | Refills: 0 | Status: SHIPPED | OUTPATIENT
Start: 2024-04-23

## 2024-04-23 RX ORDER — ROSUVASTATIN CALCIUM 10 MG/1
10 TABLET, COATED ORAL DAILY
Qty: 90 TABLET | Refills: 1 | Status: SHIPPED | OUTPATIENT
Start: 2024-04-23 | End: 2025-04-23

## 2024-04-23 NOTE — PROGRESS NOTES
" Patient ID: Jayden Panchal is a 63 y.o. male.    Chief Complaint: Follow-up    HPI     Jayden Panchal is a 63 y.o. male. here for annual exam.  Overall doing better than it was previously.  Chronic lower back pain has been better recently.    Review of Symptoms    Constitutional: Negative.    HENT: Negative.    Eyes: Negative.    Respiratory: Negative.    Cardiovascular: Negative.    Gastrointestinal: Negative.    Endocrine: Negative.    Genitourinary: Negative.    Musculoskeletal: Negative.    Skin: Negative.    Allergic/Immunologic: Negative.    Neurological: Negative.    Hematological: Negative.    Psychiatric/Behavioral: Negative.      Except as above in HPI    No current outpatient medications on file prior to visit.     No current facility-administered medications on file prior to visit.         Physical  Exam    Vitals:    04/23/24 1545 04/23/24 1623   BP: (!) 146/82 138/78   BP Location: Left arm    Patient Position: Sitting    Pulse: 76    Temp: 98.4 °F (36.9 °C)    TempSrc: Oral    SpO2: 99%    Weight: 70 kg (154 lb 5.2 oz)    Height: 5' 9" (1.753 m)           Constitutional:  Oriented to person, place, and time. Appears well-developed and well-nourished.     HENT:   Head: Normocephalic and atraumatic.     Right Ear: External ear normal     Left Ear: External ear normal      Nose: Nose normal. No rhinorrhea or nasal deformity.     Mouth/Throat: Moist mucus membranes      Eyes: Conjunctivae are normal. Right eye exhibits no discharge. Left eye exhibits no  discharge. No scleral icterus.     Neck:  No JVD present. No tracheal deviation  []  Neck supple.   Carotid Arteries  []  No Bruit    Cardiovascular:  Regular rate and rhythm with normal S1 and S2     Pulmonary/Chest:   Clear to auscultation bilaterally without wheezes, rhonchi or rales    Abdominal: Soft. No distension and no mass.  No tenderness. No rebound and No guarding.     Musculoskeletal: Normal range of motion. No edema or tenderness.   No " deformity     Lymphadenopathy:   []  No cervical adenopathy.  []  No inguinal adenopathy    Neurological:  Alert and oriented to person, place, and time. Coordination normal.     Skin: Skin is warm and dry. No rash noted. No bruising     Psychiatric: Normal mood and affect. Speech is normal and behavior is normal. Judgment and thought content normal.       Assessment / Plan:      ICD-10-CM ICD-9-CM   1. Routine health maintenance  Z00.00 V70.0   2. Chronic midline low back pain without sciatica  M54.50 724.2    G89.29 338.29   3. Lipid lowering therapy started  Z79.899 V58.69   4. Hyperkalemia  E87.5 276.7     Routine health maintenance  -     Comprehensive Metabolic Panel; Future  -     CBC Auto Differential; Future  -     Lipid Panel; Future  -     TSH; Future    Chronic midline low back pain without sciatica    Lipid lowering therapy started  -     Lipid Panel; Future; Expected date: 04/23/2024  -     Comprehensive Metabolic Panel; Future  -     CBC Auto Differential; Future  -     Lipid Panel; Future  -     TSH; Future    Hyperkalemia  -     Basic Metabolic Panel; Future; Expected date: 04/23/2024    Other orders  -     sildenafil (REVATIO) 20 mg Tab; TAKE 3 TO 5 TABLETS BY MOUTH EVERY EVENING AS NEEDED FOR ED  Dispense: 90 tablet; Refill: 0  -     rosuvastatin (CRESTOR) 10 MG tablet; Take 1 tablet (10 mg total) by mouth once daily.  Dispense: 90 tablet; Refill: 1    Chronic lower back pain-discussed continued exercises observation-lifting appropriately    Lipid-cholesterol overall bad-but high-risk when coupled with age tobacco use male an elevated cholesterol.  Discussed starting medication lower cholesterol-willing do such.      Discussed how to stay healthy             Azeem Rizvi M.D.      The 10-year ASCVD risk score (Joselin CALABRESE, et al., 2019) is: 13.6%    Values used to calculate the score:      Age: 63 years      Sex: Male      Is Non- : No      Diabetic: No      Tobacco  smoker: Yes      Systolic Blood Pressure: 138 mmHg      Is BP treated: No      HDL Cholesterol: 60 mg/dL      Total Cholesterol: 160 mg/dL

## 2024-05-07 ENCOUNTER — CLINICAL SUPPORT (OUTPATIENT)
Dept: FAMILY MEDICINE | Facility: CLINIC | Age: 64
End: 2024-05-07
Payer: COMMERCIAL

## 2024-05-07 VITALS — DIASTOLIC BLOOD PRESSURE: 72 MMHG | OXYGEN SATURATION: 97 % | SYSTOLIC BLOOD PRESSURE: 138 MMHG | HEART RATE: 84 BPM

## 2024-05-07 DIAGNOSIS — Z01.30 BLOOD PRESSURE CHECK: Primary | ICD-10-CM

## 2024-05-07 NOTE — PROGRESS NOTES
Jayden Panchal 63 y.o. male is here today for Blood Pressure check.   History of HTN no.    Review of patient's allergies indicates:   Allergen Reactions    Neurontin [gabapentin]      Severe photophobia      Pcn [penicillins]      Creatinine   Date Value Ref Range Status   04/19/2024 0.80 0.50 - 1.40 mg/dL Final     Sodium   Date Value Ref Range Status   04/19/2024 140 136 - 145 mmol/L Final     Potassium   Date Value Ref Range Status   04/19/2024 5.3 (H) 3.5 - 5.1 mmol/L Final   Patient does not take blood pressure medication.      Current Outpatient Medications:     rosuvastatin (CRESTOR) 10 MG tablet, Take 1 tablet (10 mg total) by mouth once daily., Disp: 90 tablet, Rfl: 1    sildenafil (REVATIO) 20 mg Tab, TAKE 3 TO 5 TABLETS BY MOUTH EVERY EVENING AS NEEDED FOR ED, Disp: 90 tablet, Rfl: 0  Does patient have record of home blood pressure readings no.     Patient is asymptomatic.   Complains of nothing.    BP: 138/72 , Pulse: 84 .    Blood pressure reading after 15 minutes was 138/72, Pulse 84.  Dr. Biggs notified.       
No

## 2024-07-25 RX ORDER — CODEINE PHOSPHATE AND GUAIFENESIN 10; 100 MG/5ML; MG/5ML
5 SOLUTION ORAL EVERY 6 HOURS PRN
Qty: 118 ML | Refills: 1 | Status: SHIPPED | OUTPATIENT
Start: 2024-07-25 | End: 2024-08-04

## 2024-07-25 RX ORDER — PREDNISONE 20 MG/1
TABLET ORAL
Qty: 5 TABLET | Refills: 0 | Status: SHIPPED | OUTPATIENT
Start: 2024-07-25

## 2024-07-25 RX ORDER — AZITHROMYCIN 250 MG/1
TABLET, FILM COATED ORAL
Qty: 6 TABLET | Refills: 0 | Status: SHIPPED | OUTPATIENT
Start: 2024-07-25

## 2024-07-26 ENCOUNTER — LAB VISIT (OUTPATIENT)
Dept: LAB | Facility: HOSPITAL | Age: 64
End: 2024-07-26
Attending: FAMILY MEDICINE
Payer: COMMERCIAL

## 2024-07-26 DIAGNOSIS — E87.5 HYPERKALEMIA: ICD-10-CM

## 2024-07-26 DIAGNOSIS — Z79.899 LIPID LOWERING THERAPY STARTED: ICD-10-CM

## 2024-07-26 LAB
ANION GAP SERPL CALC-SCNC: 15 MMOL/L (ref 8–16)
CALCIUM SERPL-MCNC: 8.7 MG/DL (ref 8.7–10.5)
CHLORIDE SERPL-SCNC: 98 MMOL/L (ref 95–110)
CHOLEST SERPL-MCNC: 116 MG/DL (ref 120–199)
CHOLEST/HDLC SERPL: 2.2 {RATIO} (ref 2–5)
CO2 SERPL-SCNC: 27 MMOL/L (ref 23–29)
CREAT SERPL-MCNC: 0.92 MG/DL (ref 0.5–1.4)
EST. GFR  (NO RACE VARIABLE): >60 ML/MIN/1.73 M^2
GLUCOSE SERPL-MCNC: 123 MG/DL (ref 70–110)
HDLC SERPL-MCNC: 52 MG/DL (ref 40–75)
HDLC SERPL: 44.8 % (ref 20–50)
LDLC SERPL CALC-MCNC: 53.4 MG/DL (ref 63–159)
NONHDLC SERPL-MCNC: 64 MG/DL
POTASSIUM SERPL-SCNC: 4 MMOL/L (ref 3.5–5.1)
SODIUM SERPL-SCNC: 140 MMOL/L (ref 136–145)
TRIGL SERPL-MCNC: 53 MG/DL (ref 30–150)
UUN UR-MCNC: 16 MG/DL (ref 2–20)

## 2024-07-26 PROCEDURE — 80061 LIPID PANEL: CPT | Performed by: FAMILY MEDICINE

## 2024-07-26 PROCEDURE — 80048 BASIC METABOLIC PNL TOTAL CA: CPT | Mod: PN | Performed by: FAMILY MEDICINE

## 2024-07-26 PROCEDURE — 36415 COLL VENOUS BLD VENIPUNCTURE: CPT | Mod: PN | Performed by: FAMILY MEDICINE

## 2024-09-24 DIAGNOSIS — Z79.899 LIPID LOWERING THERAPY STARTED: ICD-10-CM

## 2024-09-24 DIAGNOSIS — E87.5 HYPERKALEMIA: Primary | ICD-10-CM

## 2024-09-24 DIAGNOSIS — R73.9 HYPERGLYCEMIA: ICD-10-CM

## 2025-02-02 RX ORDER — SILDENAFIL CITRATE 20 MG/1
TABLET ORAL
Qty: 90 TABLET | Refills: 0 | Status: SHIPPED | OUTPATIENT
Start: 2025-02-02

## 2025-02-02 NOTE — TELEPHONE ENCOUNTER
Care Due:                  Date            Visit Type   Department     Provider  --------------------------------------------------------------------------------                                EP -                              PRIMARY      [facility] FAMILY  Last Visit: 04-      CARE ([facility])   MEDICINE       Azeem Biggs                              EP -                              PRIMARY      [facility] FAMILY  Next Visit: 04-      CARE ([facility])   MEDICINE       Azeem Biggs                                                            Last  Test          Frequency    Reason                     Performed    Due Date  --------------------------------------------------------------------------------    CMP.........  12 months..  rosuvastatin.............  04- 04-    [facility] Embedded Care Due Messages. Reference number: [id number].   2/02/2025 11:52:35 AM CST

## 2025-02-03 NOTE — TELEPHONE ENCOUNTER
Jayden Panchal  is requesting a refill authorization.  Brief Assessment and Rationale for Refill:  Approve     Medication Therapy Plan:  FLOS      Comments:     Note composed:9:13 PM 02/02/2025

## 2025-05-08 DIAGNOSIS — R05.2 SUBACUTE COUGH: Primary | ICD-10-CM

## 2025-05-08 RX ORDER — CODEINE PHOSPHATE AND GUAIFENESIN 10; 100 MG/5ML; MG/5ML
SOLUTION ORAL
Qty: 118 ML | OUTPATIENT
Start: 2025-05-08

## 2025-05-08 RX ORDER — CODEINE PHOSPHATE AND GUAIFENESIN 10; 100 MG/5ML; MG/5ML
5 SOLUTION ORAL EVERY 6 HOURS PRN
Qty: 118 ML | Refills: 1 | Status: SHIPPED | OUTPATIENT
Start: 2025-05-08 | End: 2025-05-18

## 2025-07-09 ENCOUNTER — TELEPHONE (OUTPATIENT)
Dept: FAMILY MEDICINE | Facility: CLINIC | Age: 65
End: 2025-07-09

## 2025-07-09 NOTE — TELEPHONE ENCOUNTER
Copied from CRM #[id number]. Topic: Appointments - Same Day Access  >> Jul 9, 2025  1:28 PM Kal wrote:  Type:  Same Day Appointment Request    Caller is requesting a same day appointment.  Caller declined first available appointment listed below.    Name of Caller:pt spouse   When is the first available appointment? N/a    Symptoms:hand skin ripped off from work on Monday   Went to clinic and they bandage him up but no antibiotics was given.       Best Call Back Number:[phone] (H)